# Patient Record
Sex: FEMALE | Race: AMERICAN INDIAN OR ALASKA NATIVE
[De-identification: names, ages, dates, MRNs, and addresses within clinical notes are randomized per-mention and may not be internally consistent; named-entity substitution may affect disease eponyms.]

---

## 2019-06-09 ENCOUNTER — HOSPITAL ENCOUNTER (EMERGENCY)
Dept: HOSPITAL 5 - ED | Age: 34
Discharge: HOME | End: 2019-06-09
Payer: COMMERCIAL

## 2019-06-09 VITALS — SYSTOLIC BLOOD PRESSURE: 131 MMHG | DIASTOLIC BLOOD PRESSURE: 80 MMHG
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
## 2019-06-09 DIAGNOSIS — M25.512: ICD-10-CM

## 2019-06-09 DIAGNOSIS — S16.1XXA: Primary | ICD-10-CM

## 2019-06-09 DIAGNOSIS — M79.671: ICD-10-CM

## 2019-06-09 DIAGNOSIS — M25.511: ICD-10-CM

## 2019-06-09 DIAGNOSIS — G43.001: ICD-10-CM
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
## 2019-06-09 PROCEDURE — 72040 X-RAY EXAM NECK SPINE 2-3 VW: CPT
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
